# Patient Record
Sex: MALE
[De-identification: names, ages, dates, MRNs, and addresses within clinical notes are randomized per-mention and may not be internally consistent; named-entity substitution may affect disease eponyms.]

---

## 2022-02-23 ENCOUNTER — LABORATORY RESULT (OUTPATIENT)
Age: 57
End: 2022-02-23

## 2022-09-27 ENCOUNTER — LABORATORY RESULT (OUTPATIENT)
Age: 57
End: 2022-09-27

## 2022-09-27 ENCOUNTER — APPOINTMENT (OUTPATIENT)
Dept: PHYSICAL MEDICINE AND REHAB | Facility: CLINIC | Age: 57
End: 2022-09-27

## 2022-09-27 VITALS
WEIGHT: 236 LBS | BODY MASS INDEX: 33.79 KG/M2 | HEIGHT: 70 IN | RESPIRATION RATE: 16 BRPM | OXYGEN SATURATION: 99 % | HEART RATE: 72 BPM | TEMPERATURE: 97.8 F | DIASTOLIC BLOOD PRESSURE: 78 MMHG | SYSTOLIC BLOOD PRESSURE: 122 MMHG

## 2022-09-27 DIAGNOSIS — Z78.9 OTHER SPECIFIED HEALTH STATUS: ICD-10-CM

## 2022-09-27 PROCEDURE — 36410 VNPNXR 3YR/> PHY/QHP DX/THER: CPT

## 2022-09-27 PROCEDURE — 99396 PREV VISIT EST AGE 40-64: CPT | Mod: 25

## 2022-09-27 PROCEDURE — 93000 ELECTROCARDIOGRAM COMPLETE: CPT | Mod: 59

## 2022-09-27 PROCEDURE — G0444 DEPRESSION SCREEN ANNUAL: CPT | Mod: NC,59

## 2022-09-27 PROCEDURE — 81003 URINALYSIS AUTO W/O SCOPE: CPT | Mod: QW

## 2022-09-27 NOTE — HISTORY OF PRESENT ILLNESS
[FreeTextEntry1] : physical exam [de-identified] : Patient presents today for physical exam. His last PE was over a year ago and since that time he has not had any significant changes to his medical history. Denies any CP, SOB or diff breathing. No recent fever, chills, cough or cold type symptoms. Has no other complaints at this time.

## 2022-10-03 LAB
25(OH)D3 SERPL-MCNC: 36.3 NG/ML
ALBUMIN SERPL ELPH-MCNC: 4.7 G/DL
ALP BLD-CCNC: 61 U/L
ALT SERPL-CCNC: 26 U/L
ANION GAP SERPL CALC-SCNC: 11 MMOL/L
AST SERPL-CCNC: 25 U/L
BASOPHILS # BLD AUTO: 0.06 K/UL
BASOPHILS NFR BLD AUTO: 1.2 %
BILIRUB SERPL-MCNC: 0.5 MG/DL
BILIRUB UR QL STRIP: NEGATIVE
BUN SERPL-MCNC: 18 MG/DL
CALCIUM SERPL-MCNC: 9.7 MG/DL
CHLORIDE SERPL-SCNC: 104 MMOL/L
CHOLEST SERPL-MCNC: 176 MG/DL
CO2 SERPL-SCNC: 25 MMOL/L
CREAT SERPL-MCNC: 1.06 MG/DL
EGFR: 83 ML/MIN/1.73M2
EOSINOPHIL # BLD AUTO: 0.24 K/UL
EOSINOPHIL NFR BLD AUTO: 4.7 %
ESTIMATED AVERAGE GLUCOSE: 123 MG/DL
FERRITIN SERPL-MCNC: 277 NG/ML
FOLATE SERPL-MCNC: 17 NG/ML
GLUCOSE SERPL-MCNC: 100 MG/DL
GLUCOSE UR-MCNC: NEGATIVE
HBA1C MFR BLD HPLC: 5.9 %
HCG UR QL: 0.2 EU/DL
HCT VFR BLD CALC: 43.8 %
HDLC SERPL-MCNC: 43 MG/DL
HGB BLD-MCNC: 14.5 G/DL
HGB UR QL STRIP.AUTO: NORMAL
IMM GRANULOCYTES NFR BLD AUTO: 0.2 %
IRON SATN MFR SERPL: 38 %
IRON SERPL-MCNC: 106 UG/DL
KETONES UR-MCNC: NEGATIVE
LDLC SERPL CALC-MCNC: 115 MG/DL
LEUKOCYTE ESTERASE UR QL STRIP: NEGATIVE
LYMPHOCYTES # BLD AUTO: 2.12 K/UL
LYMPHOCYTES NFR BLD AUTO: 41.4 %
MAGNESIUM SERPL-MCNC: 2.4 MG/DL
MAN DIFF?: NORMAL
MCHC RBC-ENTMCNC: 32.1 PG
MCHC RBC-ENTMCNC: 33.1 GM/DL
MCV RBC AUTO: 96.9 FL
MONOCYTES # BLD AUTO: 0.45 K/UL
MONOCYTES NFR BLD AUTO: 8.8 %
NEUTROPHILS # BLD AUTO: 2.24 K/UL
NEUTROPHILS NFR BLD AUTO: 43.7 %
NITRITE UR QL STRIP: NEGATIVE
NONHDLC SERPL-MCNC: 133 MG/DL
PH UR STRIP: 5.5
PLATELET # BLD AUTO: 247 K/UL
POTASSIUM SERPL-SCNC: 4.4 MMOL/L
PROT SERPL-MCNC: 7.3 G/DL
PROT UR STRIP-MCNC: NEGATIVE
PSA SERPL-MCNC: 0.49 NG/ML
RBC # BLD: 4.52 M/UL
RBC # FLD: 12.8 %
SODIUM SERPL-SCNC: 140 MMOL/L
SP GR UR STRIP: 1.01
T3 SERPL-MCNC: 102 NG/DL
T3RU NFR SERPL: 1.1 TBI
T4 FREE SERPL-MCNC: 1.1 NG/DL
TESTOST SERPL-MCNC: 400 NG/DL
TIBC SERPL-MCNC: 280 UG/DL
TRIGL SERPL-MCNC: 90 MG/DL
TSH SERPL-ACNC: 2 UIU/ML
UIBC SERPL-MCNC: 173 UG/DL
VIT B12 SERPL-MCNC: 742 PG/ML
WBC # FLD AUTO: 5.12 K/UL

## 2022-10-11 ENCOUNTER — APPOINTMENT (OUTPATIENT)
Dept: PHYSICAL MEDICINE AND REHAB | Facility: CLINIC | Age: 57
End: 2022-10-11

## 2022-10-11 VITALS — BODY MASS INDEX: 33.79 KG/M2 | HEIGHT: 70 IN | WEIGHT: 236 LBS

## 2022-10-11 PROCEDURE — 99213 OFFICE O/P EST LOW 20 MIN: CPT | Mod: 95

## 2022-10-11 NOTE — PLAN
[FreeTextEntry1] : Labs reviewed with patient during this encounter.\par \par A1C = 5.9\par \par Patient to continue with present medications. \par Patient to start Vitamin therapy as discussed during visit - See medications below. \par Increase fluid intake.. \par RTO for repeat labs in 6 months. \par RTO in 6 months for re-evaluation. \par Diet teaching performed in depth during this visit related to cholesterol. \par At least 25 minutes was spent with patient via video conference reviewing findings/results during this visit. Ample time was provided to answer any questions or address concerns to the patients satisfaction..\par \par Patient was advised that this audiovisual encounter constitutes a billable visit, and that the visit will be billed on the same terms and conditions as an in-office, face-to-face visit. Patient was further advised they may be responsible for any co-payment, co-insurance, or other self-payment they would normally pay for an office visit, unless such self-payment was waived by their insurance carrier.\par 		\par At this time the patient is not willing to begin medication therapy for Cholesterol and wishes to modify diet in attempt to correct levels. The risk of elevated lipids were discussed at length during this visit and patient states understanding..\par \par

## 2022-10-11 NOTE — HISTORY OF PRESENT ILLNESS
[FreeTextEntry1] : Cholesterol [de-identified] : Patient encounter today for follow up on cholesterol.  States he has been doing well.  Denies any CP, SOB or diff breathing.  No recent fever, chills, cough or cold type symptoms.  Has no other complaints at this time.\par \par

## 2023-04-11 ENCOUNTER — APPOINTMENT (OUTPATIENT)
Dept: PHYSICAL MEDICINE AND REHAB | Facility: CLINIC | Age: 58
End: 2023-04-11
Payer: COMMERCIAL

## 2023-04-11 VITALS
DIASTOLIC BLOOD PRESSURE: 84 MMHG | SYSTOLIC BLOOD PRESSURE: 126 MMHG | OXYGEN SATURATION: 99 % | RESPIRATION RATE: 16 BRPM | TEMPERATURE: 98.1 F | HEART RATE: 64 BPM | BODY MASS INDEX: 34.5 KG/M2 | HEIGHT: 70 IN | WEIGHT: 241 LBS

## 2023-04-11 PROCEDURE — 36410 VNPNXR 3YR/> PHY/QHP DX/THER: CPT

## 2023-04-11 PROCEDURE — 99213 OFFICE O/P EST LOW 20 MIN: CPT | Mod: 25

## 2023-04-11 NOTE — PHYSICAL EXAM
[No Acute Distress] : no acute distress [Well Nourished] : well nourished [Well Developed] : well developed [Well-Appearing] : well-appearing [Normal Sclera/Conjunctiva] : normal sclera/conjunctiva [PERRL] : pupils equal round and reactive to light [EOMI] : extraocular movements intact [Normal Outer Ear/Nose] : the outer ears and nose were normal in appearance [Normal Oropharynx] : the oropharynx was normal [No JVD] : no jugular venous distention [No Lymphadenopathy] : no lymphadenopathy [Supple] : supple [Thyroid Normal, No Nodules] : the thyroid was normal and there were no nodules present [No Respiratory Distress] : no respiratory distress  [No Accessory Muscle Use] : no accessory muscle use [Clear to Auscultation] : lungs were clear to auscultation bilaterally [Normal Rate] : normal rate  [Regular Rhythm] : with a regular rhythm [Normal S1, S2] : normal S1 and S2 [No Carotid Bruits] : no carotid bruits [No Abdominal Bruit] : a ~M bruit was not heard ~T in the abdomen [No Varicosities] : no varicosities [Pedal Pulses Present] : the pedal pulses are present [No Edema] : there was no peripheral edema [No Palpable Aorta] : no palpable aorta [No Extremity Clubbing/Cyanosis] : no extremity clubbing/cyanosis [Soft] : abdomen soft [Non Tender] : non-tender [Non-distended] : non-distended [No Masses] : no abdominal mass palpated [No HSM] : no HSM [Normal Bowel Sounds] : normal bowel sounds [Normal Posterior Cervical Nodes] : no posterior cervical lymphadenopathy [Normal Anterior Cervical Nodes] : no anterior cervical lymphadenopathy [No CVA Tenderness] : no CVA  tenderness [No Joint Swelling] : no joint swelling [No Spinal Tenderness] : no spinal tenderness [Grossly Normal Strength/Tone] : grossly normal strength/tone [No Rash] : no rash [Coordination Grossly Intact] : coordination grossly intact [No Focal Deficits] : no focal deficits [Normal Gait] : normal gait [Deep Tendon Reflexes (DTR)] : deep tendon reflexes were 2+ and symmetric [Normal Affect] : the affect was normal [Normal Insight/Judgement] : insight and judgment were intact [de-identified] : systolic murmur

## 2023-04-11 NOTE — REVIEW OF SYSTEMS
[Negative] : Heme/Lymph [Nausea] : nausea [Abdominal Pain] : no abdominal pain [Constipation] : no constipation [Diarrhea] : no diarrhea [Vomiting] : no vomiting

## 2023-04-11 NOTE — HISTORY OF PRESENT ILLNESS
[FreeTextEntry1] : Cholesterol and pre-diabetes  [de-identified] : Patient encounter today for re-evaluation of cholesterol.  States he has been doing well.  Patient reports sinus infection in January and March, though states his sx have resolved. Patient reports straining both of his calves after running. He reports the pain has resolved last week and he plans to start walking/running again. Patient reports mild nausea after eating since last week that has been improving. He reports eating protein bar for breakfast, salad for lunch, and protein for dinner most days. He denies abdominal pain , vomiting, and diarrhea. Denies any CP, SOB or diff breathing.  No recent fever, chills, cough or cold type symptoms.  Has no other complaints at this time.\par \par

## 2023-04-11 NOTE — PLAN
[FreeTextEntry1] : \par Labs Drawn by Dr. Pino Caban due to poor venous access.  Patient required lab testing due to conditions in their past medical history requiring periodic monitoring.  Labs were sent to [facility].\par \par \par Discussed and reviewed current medications as follows; \par Rosuvastatin for HLD\par Patient to continue with present medications - all medications reconciled/reviewed during this visit and listed above.  \par Increase fluid intake.\par RTO in 7-10 days for re-evaluation.\par At least 20 minutes was spent with patient face to face examining and reviewing findings/results during this visit. Ample time was provided to answer any questions or address concerns to the patients satisfaction.\par \par I, Tootie Jay, attest that this documentation has been prepared under the direction and in the presence of Provider Pino Caban DNP\par \par The documentation recorded by the scribe, in my presence, accurately reflects the service I personally performed, and the decisions made by me with my edits as appropriate.\par Pino Caban DNP\par

## 2023-04-12 LAB
25(OH)D3 SERPL-MCNC: 23.7 NG/ML
ALBUMIN SERPL ELPH-MCNC: 4.6 G/DL
ALP BLD-CCNC: 64 U/L
ALT SERPL-CCNC: 27 U/L
ANION GAP SERPL CALC-SCNC: 11 MMOL/L
AST SERPL-CCNC: 21 U/L
BASOPHILS # BLD AUTO: 0.03 K/UL
BASOPHILS NFR BLD AUTO: 0.6 %
BILIRUB SERPL-MCNC: 0.4 MG/DL
BUN SERPL-MCNC: 19 MG/DL
CALCIUM SERPL-MCNC: 9.5 MG/DL
CHLORIDE SERPL-SCNC: 101 MMOL/L
CHOLEST SERPL-MCNC: 160 MG/DL
CO2 SERPL-SCNC: 26 MMOL/L
CREAT SERPL-MCNC: 1 MG/DL
EGFR: 88 ML/MIN/1.73M2
EOSINOPHIL # BLD AUTO: 0.29 K/UL
EOSINOPHIL NFR BLD AUTO: 5.3 %
ESTIMATED AVERAGE GLUCOSE: 114 MG/DL
GLUCOSE SERPL-MCNC: 99 MG/DL
HBA1C MFR BLD HPLC: 5.6 %
HCT VFR BLD CALC: 45 %
HDLC SERPL-MCNC: 42 MG/DL
HGB BLD-MCNC: 14.2 G/DL
IMM GRANULOCYTES NFR BLD AUTO: 0.4 %
LDLC SERPL CALC-MCNC: 93 MG/DL
LYMPHOCYTES # BLD AUTO: 1.9 K/UL
LYMPHOCYTES NFR BLD AUTO: 34.9 %
MAN DIFF?: NORMAL
MCHC RBC-ENTMCNC: 31.4 PG
MCHC RBC-ENTMCNC: 31.6 GM/DL
MCV RBC AUTO: 99.6 FL
MONOCYTES # BLD AUTO: 0.45 K/UL
MONOCYTES NFR BLD AUTO: 8.3 %
NEUTROPHILS # BLD AUTO: 2.76 K/UL
NEUTROPHILS NFR BLD AUTO: 50.5 %
NONHDLC SERPL-MCNC: 118 MG/DL
PLATELET # BLD AUTO: 275 K/UL
POTASSIUM SERPL-SCNC: 4 MMOL/L
PROT SERPL-MCNC: 7.2 G/DL
RBC # BLD: 4.52 M/UL
RBC # FLD: 13.2 %
SODIUM SERPL-SCNC: 138 MMOL/L
TRIGL SERPL-MCNC: 125 MG/DL
WBC # FLD AUTO: 5.45 K/UL

## 2023-04-20 ENCOUNTER — APPOINTMENT (OUTPATIENT)
Dept: PHYSICAL MEDICINE AND REHAB | Facility: CLINIC | Age: 58
End: 2023-04-20

## 2023-04-26 ENCOUNTER — APPOINTMENT (OUTPATIENT)
Dept: PHYSICAL MEDICINE AND REHAB | Facility: CLINIC | Age: 58
End: 2023-04-26
Payer: COMMERCIAL

## 2023-04-26 VITALS — HEIGHT: 70 IN | BODY MASS INDEX: 34.5 KG/M2 | WEIGHT: 241 LBS

## 2023-04-26 PROCEDURE — 99213 OFFICE O/P EST LOW 20 MIN: CPT | Mod: 95

## 2023-04-26 NOTE — HISTORY OF PRESENT ILLNESS
[Home] : at home, [unfilled] , at the time of the visit. [Medical Office: ([facility])___] : at the medical office located in  [Verbal consent obtained from patient] : the patient, [unfilled] [FreeTextEntry1] : Cholesterol [de-identified] : Patient encounter today for re-evaluation of cholesterol.  States he has been doing well.  Denies any CP, SOB or diff breathing.  No recent fever, chills, cough or cold type symptoms.  Has no other complaints at this time.\par \par

## 2023-04-26 NOTE — PLAN
[FreeTextEntry1] : Labs reviewed with patient during this encounter.\par \par A1C = 5.6 from 5.9\par \par Patient to continue with present medications - all medications reconciled/reviewed during this visit and listed above\par Patient to start Vitamin therapy as discussed during visit\par Increase fluid intake.. \par RTO for repeat labs in 6 months. \par RTO in 6 months for re-evaluation. \par Diet teaching for at least 8 minutes.performed in depth during this visit related to cholesterol and cardiovascular risks.\par At least 25 minutes was spent with patient via video conference reviewing findings/results during this visit. Ample time was provided to answer any questions or address concerns to the patients satisfaction..\par \par Patient was advised that this audiovisual encounter constitutes a billable visit, and that the visit will be billed on the same terms and conditions as an in-office, face-to-face visit. Patient was further advised they may be responsible for any co-payment, co-insurance, or other self-payment they would normally pay for an office visit, unless such self-payment was waived by their insurance carrier.\par \par \par

## 2023-07-12 ENCOUNTER — APPOINTMENT (OUTPATIENT)
Dept: PHYSICAL MEDICINE AND REHAB | Facility: CLINIC | Age: 58
End: 2023-07-12
Payer: COMMERCIAL

## 2023-07-12 ENCOUNTER — RESULT REVIEW (OUTPATIENT)
Age: 58
End: 2023-07-12

## 2023-07-12 VITALS
DIASTOLIC BLOOD PRESSURE: 76 MMHG | HEIGHT: 70 IN | HEART RATE: 80 BPM | SYSTOLIC BLOOD PRESSURE: 120 MMHG | RESPIRATION RATE: 16 BRPM | WEIGHT: 241 LBS | TEMPERATURE: 97.8 F | BODY MASS INDEX: 34.5 KG/M2 | OXYGEN SATURATION: 97 %

## 2023-07-12 DIAGNOSIS — J18.9 PNEUMONIA, UNSPECIFIED ORGANISM: ICD-10-CM

## 2023-07-12 PROCEDURE — 99214 OFFICE O/P EST MOD 30 MIN: CPT

## 2023-07-12 PROCEDURE — 99213 OFFICE O/P EST LOW 20 MIN: CPT

## 2023-07-12 NOTE — PLAN
[FreeTextEntry1] : \par Labs Drawn by Dr. Pino Caban due to poor venous access.  Patient required lab testing due to conditions in their past medical history requiring periodic monitoring.  Labs were sent to [facility].\par \par Start Prednisone and Cefdinir. \par D/C Doxycyline.\par Continue inhaler BID. \par Advised patient to use Children's liquid Benadryl, Ella Anvik plus, and Dayquil.\par Referral given for CXR.\par Discussed and reviewed current medications as follows; \par Rosuvastatin for HLD\par Patient to continue with present medications - all medications reconciled/reviewed during this visit and listed above.  \par Increase fluid intake.\par RTO in 7-10 days for re-evaluation.\par At least 20 minutes was spent with patient face to face examining and reviewing findings/results during this visit. Ample time was provided to answer any questions or address concerns to the patients satisfaction.\par \par I, Tootie Jay, attest that this documentation has been prepared under the direction and in the presence of Provider Pino Caban DNP\par \par The documentation recorded by the scribe, in my presence, accurately reflects the service I personally performed, and the decisions made by me with my edits as appropriate.\par Pino Caban DNP\par

## 2023-07-12 NOTE — HISTORY OF PRESENT ILLNESS
[FreeTextEntry1] : URI [de-identified] : Patient presents today for further evaluation of cough and congestion for the past 4 days. Pt was prescribed Prednisone and Bromphed from urgent care 2 weeks ago. Pt was re-evaluated 10 days ago, prescribed doxycycline and albuterol inhaler after CXR, which he has been using daily.  States their symptoms have been getting worse despite the use of OTC meds. Reports productive cough and PND. denies earache. denies any CP, SOB or diff breathing.  No recorded fever or chills.  States symptoms worsen with exertion, no alleviating factors.  Has no other complaints at this time.\par

## 2023-07-12 NOTE — PHYSICAL EXAM
[No Acute Distress] : no acute distress [Well Nourished] : well nourished [Well Developed] : well developed [Well-Appearing] : well-appearing [Normal Sclera/Conjunctiva] : normal sclera/conjunctiva [PERRL] : pupils equal round and reactive to light [EOMI] : extraocular movements intact [Normal Outer Ear/Nose] : the outer ears and nose were normal in appearance [Normal Oropharynx] : the oropharynx was normal [No JVD] : no jugular venous distention [Supple] : supple [Thyroid Normal, No Nodules] : the thyroid was normal and there were no nodules present [No Respiratory Distress] : no respiratory distress  [No Accessory Muscle Use] : no accessory muscle use [Normal Rate] : normal rate  [Regular Rhythm] : with a regular rhythm [Normal S1, S2] : normal S1 and S2 [No Carotid Bruits] : no carotid bruits [No Abdominal Bruit] : a ~M bruit was not heard ~T in the abdomen [No Varicosities] : no varicosities [Pedal Pulses Present] : the pedal pulses are present [No Edema] : there was no peripheral edema [No Palpable Aorta] : no palpable aorta [No Extremity Clubbing/Cyanosis] : no extremity clubbing/cyanosis [Soft] : abdomen soft [Non Tender] : non-tender [Non-distended] : non-distended [No Masses] : no abdominal mass palpated [No HSM] : no HSM [Normal Bowel Sounds] : normal bowel sounds [Normal Posterior Cervical Nodes] : no posterior cervical lymphadenopathy [Normal Anterior Cervical Nodes] : no anterior cervical lymphadenopathy [No CVA Tenderness] : no CVA  tenderness [No Spinal Tenderness] : no spinal tenderness [No Joint Swelling] : no joint swelling [Grossly Normal Strength/Tone] : grossly normal strength/tone [No Rash] : no rash [Coordination Grossly Intact] : coordination grossly intact [No Focal Deficits] : no focal deficits [Normal Gait] : normal gait [Deep Tendon Reflexes (DTR)] : deep tendon reflexes were 2+ and symmetric [Normal Affect] : the affect was normal [Normal Insight/Judgement] : insight and judgment were intact [de-identified] : post nasal drip  [de-identified] : mild anterior cervical lymphadenopathy, no posterior or post auricular lymphadenopathy  [de-identified] : expiratory wheezes throughout, no rales or rhonchi

## 2023-08-09 ENCOUNTER — APPOINTMENT (OUTPATIENT)
Dept: PHYSICAL MEDICINE AND REHAB | Facility: CLINIC | Age: 58
End: 2023-08-09
Payer: COMMERCIAL

## 2023-08-09 ENCOUNTER — LABORATORY RESULT (OUTPATIENT)
Age: 58
End: 2023-08-09

## 2023-08-09 ENCOUNTER — NON-APPOINTMENT (OUTPATIENT)
Age: 58
End: 2023-08-09

## 2023-08-09 VITALS
HEART RATE: 88 BPM | DIASTOLIC BLOOD PRESSURE: 76 MMHG | BODY MASS INDEX: 34.5 KG/M2 | RESPIRATION RATE: 16 BRPM | OXYGEN SATURATION: 98 % | TEMPERATURE: 97.9 F | SYSTOLIC BLOOD PRESSURE: 120 MMHG | HEIGHT: 70 IN | WEIGHT: 241 LBS

## 2023-08-09 DIAGNOSIS — Z00.00 ENCOUNTER FOR GENERAL ADULT MEDICAL EXAMINATION W/OUT ABNORMAL FINDINGS: ICD-10-CM

## 2023-08-09 PROCEDURE — 93000 ELECTROCARDIOGRAM COMPLETE: CPT | Mod: 59

## 2023-08-09 PROCEDURE — 99396 PREV VISIT EST AGE 40-64: CPT | Mod: 25

## 2023-08-09 PROCEDURE — G0444 DEPRESSION SCREEN ANNUAL: CPT | Mod: 33,59

## 2023-08-09 PROCEDURE — 81003 URINALYSIS AUTO W/O SCOPE: CPT | Mod: QW

## 2023-08-09 PROCEDURE — 36410 VNPNXR 3YR/> PHY/QHP DX/THER: CPT

## 2023-08-09 RX ORDER — CHROMIUM 200 MCG
TABLET ORAL
Refills: 0 | Status: ACTIVE | COMMUNITY

## 2023-08-09 NOTE — PLAN
[FreeTextEntry1] : Labs Drawn by Dr. Pino Caban due to poor venous access.  Patient required lab testing due to conditions in their past medical history requiring periodic monitoring.  Labs were sent to [facility].  EKG - NSR - 67  , MARION - 0. 164, No acute T wave changes noted..  Discussed and reviewed medications with patient as follows; Rosuvastatin- HLD  Patient to continue with present medications - all medications reconciled/reviewed during this visit and listed above.  Increase fluid intake.  RTO in 7-10 days for re-evaluation. I, Tootie Jay, attest that this documentation has been prepared under the direction and in the presence of Provider Pino Caban DNP The documentation recorded by the scribe, in my presence, accurately reflects the service I personally performed, and the decisions made by me with my edits as appropriate. Pino Caban DNP

## 2023-08-09 NOTE — PHYSICAL EXAM
[No Acute Distress] : no acute distress [Well Nourished] : well nourished [Well Developed] : well developed [Well-Appearing] : well-appearing [Normal Sclera/Conjunctiva] : normal sclera/conjunctiva [PERRL] : pupils equal round and reactive to light [EOMI] : extraocular movements intact [Normal Outer Ear/Nose] : the outer ears and nose were normal in appearance [Normal Oropharynx] : the oropharynx was normal [No JVD] : no jugular venous distention [No Lymphadenopathy] : no lymphadenopathy [Supple] : supple [Thyroid Normal, No Nodules] : the thyroid was normal and there were no nodules present [No Respiratory Distress] : no respiratory distress  [No Accessory Muscle Use] : no accessory muscle use [Clear to Auscultation] : lungs were clear to auscultation bilaterally [Normal Rate] : normal rate  [Regular Rhythm] : with a regular rhythm [Normal S1, S2] : normal S1 and S2 [No Carotid Bruits] : no carotid bruits [No Abdominal Bruit] : a ~M bruit was not heard ~T in the abdomen [No Varicosities] : no varicosities [Pedal Pulses Present] : the pedal pulses are present [No Edema] : there was no peripheral edema [No Palpable Aorta] : no palpable aorta [No Extremity Clubbing/Cyanosis] : no extremity clubbing/cyanosis [Soft] : abdomen soft [Non Tender] : non-tender [Non-distended] : non-distended [No Masses] : no abdominal mass palpated [No HSM] : no HSM [Normal Bowel Sounds] : normal bowel sounds [Normal Posterior Cervical Nodes] : no posterior cervical lymphadenopathy [Normal Anterior Cervical Nodes] : no anterior cervical lymphadenopathy [No CVA Tenderness] : no CVA  tenderness [No Spinal Tenderness] : no spinal tenderness [No Joint Swelling] : no joint swelling [Grossly Normal Strength/Tone] : grossly normal strength/tone [No Rash] : no rash [Coordination Grossly Intact] : coordination grossly intact [No Focal Deficits] : no focal deficits [Normal Gait] : normal gait [Deep Tendon Reflexes (DTR)] : deep tendon reflexes were 2+ and symmetric [Normal Affect] : the affect was normal [Normal Insight/Judgement] : insight and judgment were intact [de-identified] : cardiac murmur

## 2023-08-09 NOTE — HEALTH RISK ASSESSMENT
[0] : 2) Feeling down, depressed, or hopeless: Not at all (0) [PHQ-9 Negative - No further assessment needed] : PHQ-9 Negative - No further assessment needed [Patient reported colonoscopy was normal] : Patient reported colonoscopy was normal [ColonoscopyDate] : 09/2022

## 2023-08-09 NOTE — HISTORY OF PRESENT ILLNESS
[FreeTextEntry1] : annual wellness visit  [de-identified] : Patient presents today for physical exam. His last PE was over a year ago and since that time he has not had any significant changes to his medical history. Pt states his cough and congestion has improved since last visit. Denies any CP, SOB or diff breathing. Denies abdominal pain, blood in stool, or changes in bowel habits. No recent fever or chills. Has no other complaints at this time.

## 2023-08-14 LAB
25(OH)D3 SERPL-MCNC: 37.2 NG/ML
ALBUMIN SERPL ELPH-MCNC: 4.4 G/DL
ALP BLD-CCNC: 59 U/L
ALT SERPL-CCNC: 23 U/L
ANION GAP SERPL CALC-SCNC: 13 MMOL/L
AST SERPL-CCNC: 24 U/L
B BURGDOR AB SER-IMP: NEGATIVE
B BURGDOR IGG+IGM SER QL: 0.14 INDEX
BILIRUB SERPL-MCNC: 0.4 MG/DL
BILIRUB UR QL STRIP: NEGATIVE
BUN SERPL-MCNC: 16 MG/DL
CALCIUM SERPL-MCNC: 9.2 MG/DL
CHLORIDE SERPL-SCNC: 103 MMOL/L
CHOLEST SERPL-MCNC: 205 MG/DL
CO2 SERPL-SCNC: 22 MMOL/L
COLLECTION METHOD: NORMAL
CREAT SERPL-MCNC: 0.97 MG/DL
EGFR: 92 ML/MIN/1.73M2
ESTIMATED AVERAGE GLUCOSE: 117 MG/DL
FERRITIN SERPL-MCNC: 250 NG/ML
FOLATE SERPL-MCNC: >20 NG/ML
GLUCOSE SERPL-MCNC: 103 MG/DL
GLUCOSE UR-MCNC: NEGATIVE
HBA1C MFR BLD HPLC: 5.7 %
HCG UR QL: 0.2 EU/DL
HDLC SERPL-MCNC: 52 MG/DL
HGB UR QL STRIP.AUTO: NORMAL
IRON SATN MFR SERPL: 33 %
IRON SERPL-MCNC: 108 UG/DL
KETONES UR-MCNC: NEGATIVE
LDLC SERPL CALC-MCNC: 136 MG/DL
LEUKOCYTE ESTERASE UR QL STRIP: NEGATIVE
MAGNESIUM SERPL-MCNC: 2.3 MG/DL
NITRITE UR QL STRIP: NEGATIVE
NONHDLC SERPL-MCNC: 153 MG/DL
PH UR STRIP: 6
POTASSIUM SERPL-SCNC: 4.4 MMOL/L
PROT SERPL-MCNC: 6.9 G/DL
PROT UR STRIP-MCNC: NEGATIVE
PSA SERPL-MCNC: 0.46 NG/ML
SODIUM SERPL-SCNC: 138 MMOL/L
SP GR UR STRIP: 1.02
T3 SERPL-MCNC: 104 NG/DL
T3RU NFR SERPL: 1 TBI
T4 FREE SERPL-MCNC: 1.1 NG/DL
TESTOST SERPL-MCNC: 423 NG/DL
TIBC SERPL-MCNC: 325 UG/DL
TRIGL SERPL-MCNC: 97 MG/DL
TSH SERPL-ACNC: 2.65 UIU/ML
UIBC SERPL-MCNC: 217 UG/DL
VIT B12 SERPL-MCNC: 723 PG/ML

## 2023-08-24 ENCOUNTER — APPOINTMENT (OUTPATIENT)
Dept: PHYSICAL MEDICINE AND REHAB | Facility: CLINIC | Age: 58
End: 2023-08-24
Payer: COMMERCIAL

## 2023-08-24 VITALS — WEIGHT: 241 LBS | HEIGHT: 70 IN | BODY MASS INDEX: 34.5 KG/M2

## 2023-08-24 PROCEDURE — 99213 OFFICE O/P EST LOW 20 MIN: CPT | Mod: 95

## 2023-08-24 NOTE — PLAN
[FreeTextEntry1] : Labs reviewed with patient during this encounter.  Patient to continue with present medications - all medications reconciled/reviewed during this visit and listed above Patient to start Vitamin therapy as discussed during visit Increase fluid intake..  RTO for repeat labs in 6 months.  RTO in 6 months for re-evaluation.  Diet teaching for at least 8 minutes.performed in depth during this visit related to cholesterol and cardiovascular risks. At least 25 minutes was spent with patient via video conference reviewing findings/results during this visit. Ample time was provided to answer any questions or address concerns to the patients satisfaction..  Patient was advised that this audiovisual encounter constitutes a billable visit, and that the visit will be billed on the same terms and conditions as an in-office, face-to-face visit. Patient was further advised they may be responsible for any co-payment, co-insurance, or other self-payment they would normally pay for an office visit, unless such self-payment was waived by their insurance carrier. 		 At this time the patient is not willing to begin medication therapy for Cholesterol and wishes to modify diet in attempt to correct levels. The risk of elevated lipids were discussed at length during this visit and patient states understanding..

## 2023-08-24 NOTE — HISTORY OF PRESENT ILLNESS
[Home] : at home, [unfilled] , at the time of the visit. [Medical Office: ([facility])___] : at the medical office located in  [Verbal consent obtained from patient] : the patient, [unfilled] [FreeTextEntry1] : Cholesterol [de-identified] : Patient encounter today for re-evaluation of cholesterol.  States he has been doing well.  Denies any CP, SOB or diff breathing.  No recent fever, chills, cough or cold type symptoms.  Has no other complaints at this time.

## 2023-12-28 ENCOUNTER — APPOINTMENT (OUTPATIENT)
Dept: ORTHOPEDIC SURGERY | Facility: CLINIC | Age: 58
End: 2023-12-28
Payer: OTHER MISCELLANEOUS

## 2023-12-28 VITALS — WEIGHT: 240 LBS | BODY MASS INDEX: 34.36 KG/M2 | HEIGHT: 70 IN

## 2023-12-28 DIAGNOSIS — M76.32 ILIOTIBIAL BAND SYNDROME, LEFT LEG: ICD-10-CM

## 2023-12-28 DIAGNOSIS — M25.562 PAIN IN LEFT KNEE: ICD-10-CM

## 2023-12-28 DIAGNOSIS — Z78.9 OTHER SPECIFIED HEALTH STATUS: ICD-10-CM

## 2023-12-28 DIAGNOSIS — M17.12 UNILATERAL PRIMARY OSTEOARTHRITIS, LEFT KNEE: ICD-10-CM

## 2023-12-28 PROCEDURE — 73564 X-RAY EXAM KNEE 4 OR MORE: CPT | Mod: LT

## 2023-12-28 PROCEDURE — 99214 OFFICE O/P EST MOD 30 MIN: CPT

## 2023-12-28 NOTE — IMAGING
[Left] : left knee [All Views] : anteroposterior, lateral, skyline, and anteroposterior standing [de-identified] : The patient is a well appearing 58 year  old male of their stated age.  Patient ambulates with a normal gait.  Negative straight leg raise bilateral   Effected Knee: LEFT  ROM:  0-145 degrees  Lachman: Negative  Pivot Shift: Negative  Anterior Drawer: Negative  Posterior Drawer / Sag:Negative  Varus Stress 0 degrees: Stable  Varus Stress 30 degrees: Stable  Valgus Stress 0 degrees: Stable  Valgus Stress 30 degrees: Stable  Medial Kaitlynn: Negative  Lateral Kaitlynn: Negative  Patella Glide: 2+  Patella Apprehension: Negative  Patella Grind: Negative  Pes [place] Valgus: Negative  Pes Cavus: Negative   Palpation:  Medial Joint Line: Nontender  Lateral Joint Line: Nontender  Medial Collateral Ligament: Nontender  Lateral Collateral Ligament/PLC: Nontender  Distal Femur: Nontender  Proximal Tibia: Nontender  Tibial Tubercle: Nontender  Gerdy's Tubercle: Nontender  Distal Pole Patella: Nontender  Quadriceps Tendon: Nontender &  Intact  Patella Tendon: Nontender &  Intact  Medial Femoral Condyle: Nontender  Medial Distal Hamstring/PES: Nontender  Lateral Distal Hamstring: Nontender & Stable  Iliotibial Band: Nontender & TIGHT Medial Patellofemoral Ligament: Nontender  Adductor: Nontender  Proximal GSC-Plantaris: Nontender  Calf: Supple & Nontender   Inspection:  Deformity: No  Erythema: No  Ecchymosis: No  Abrasions: No  Effusion: No  Prepatella Bursitis: No  Neurologic Exam:  Sensation L4-S1: Grossly Intact  Motor Exam:  Quadriceps: 5 out of 5  Hamstrings: 5 out of 5  EHL: 5 out of 5  FHL: 5 out of 5  TA: 5 out of 5  GS: 5 out of 5  Circulatory/Pulses:  Dorsalis Pedis: 2+  Posterior Tibialis: 2+  Additional Pertinent Findings: None   Contralateral Knee:                           	  ROM: 0-145 degrees  Other Pertinent Findings: None   Assessment: The patient is a 58 year old male with left knee pain and radiographic and physical exam findings consistent with early arthritis and ITB syndrome.    The patients condition is acute  Documents/Results Reviewed Today: X-Ray left knee  Tests/Studies Independently Interpreted Today: X-Ray left knee reveals evidence of mild medial joint compartment narrowing, mild patellofemoral OA.  Pertinent findings include: 0-145, 5/5 quadriceps strength, no tenderness at this time, tight ITB. Confounding medical conditions/concerns: Hx of left knee medial meniscectomy (6/18/19) with Dr. Ballesteros  Plan: Advised the patient that his recent onset of knee pain is consistent with an aggravation of early arthritic change. Patient will start physical therapy, HEP, and stretching. Advised patient to obtain Reparel sleeve. Recommended rolling ITB. Discussed taking OTC anti-inflammatories as needed - use as directed. Modify activity as discussed.  Tests Ordered: None  Prescription Medications Ordered: Discussed appropriate use of OTC anti-inflammatories and analgesics (including but not limited to Aleve, Advil, Tylenol, Motrin, Ibuprofen, Voltaren gel, etc.)  Braces/DME Ordered: None  Activity/Work/Sports Status: None  Additional Instructions: None Follow-Up: PRN  The patient's current medication management of their orthopedic diagnosis was reviewed today: (1) We discussed a comprehensive treatment plan that included possible pharmaceutical management involving the use of prescription strength medications including but not limited to options such as oral Naprosyn 500mg BID, once daily Meloxicam 15 mg, or 500-650 mg Tylenol versus over the counter oral medications and topical prescription NSAID Pennsaid vs over the counter Voltaren gel.  Based on our extensive discussion, the patient declined prescription medication and will use over the counter Advil, Alleve, Voltaren Gel or Tylenol as directed. (2) There is a moderate risk of morbidity with further treatment, especially from use of prescription strength medications and possible side effects of these medications which include upset stomach with oral medications, skin reactions to topical medications and cardiac/renal issues with long term use. (3) I recommended that the patient follow-up with their medical physician to discuss any significant specific potential issues with long term medication use such as interactions with current medications or with exacerbation of underlying medical comorbidities. (4) The benefits and risks associated with use of injectable, oral or topical, prescription and over the counter anti-inflammatory medications were discussed with the patient. The patient voiced understanding of the risks including but not limited to bleeding, stroke, kidney dysfunction, heart disease, and were referred to the black box warning label for further information.   Daina SPENCER attest that this documentation has been prepared under the direction and in the presence of Provider Dr. Chester Ballesteros.   The documentation recorded by the scribe accurately reflects the services I, Dr. Chester Ballesteros, personally performed and the decisions made by me. [FreeTextEntry9] : X-Ray left knee reveals evidence of mild medial joint compartment narrowing, mild patellofemoral OA.

## 2023-12-28 NOTE — HISTORY OF PRESENT ILLNESS
[de-identified] : The patient is a 58 year  old right hand dominant male who presents today complaining of left knee pain.   Date of Injury/Onset: 2/25/19; Reinjury 10/25/2023 Pain:    At Rest: 8/10  With Activity:  8/10  Mechanism of injury: Pt injured his knee at work in 2019 and had surgery with Dr. Johnston. 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 This is not an athletic injury occurring associated with an interscholastic or organized sports team. Quality of symptoms: dull aching pain at rest, sharp pain with activity, limited ROM, stiffness Improves with: ice, NSAIDs Worse with: bending, kneeling, prolonged sitting Prior treatment: none Prior Imaging: none Out of work/sport: currently working School/Sport/Position/Occupation: Phys  at [employer] Additional Information: Has seen Dr. johnston in the past for this knee

## 2024-02-05 ENCOUNTER — APPOINTMENT (OUTPATIENT)
Dept: PHYSICAL MEDICINE AND REHAB | Facility: CLINIC | Age: 59
End: 2024-02-05
Payer: COMMERCIAL

## 2024-02-05 VITALS — HEART RATE: 81 BPM | TEMPERATURE: 97.8 F | RESPIRATION RATE: 16 BRPM | OXYGEN SATURATION: 98 %

## 2024-02-05 PROCEDURE — 99213 OFFICE O/P EST LOW 20 MIN: CPT

## 2024-02-05 RX ORDER — ROSUVASTATIN CALCIUM 20 MG/1
20 TABLET, FILM COATED ORAL
Qty: 90 | Refills: 1 | Status: ACTIVE | COMMUNITY

## 2024-02-05 RX ORDER — ROSUVASTATIN CALCIUM 10 MG/1
10 TABLET, FILM COATED ORAL DAILY
Qty: 90 | Refills: 1 | Status: DISCONTINUED | COMMUNITY
Start: 2022-09-27 | End: 2024-02-05

## 2024-02-05 NOTE — PLAN
[FreeTextEntry1] : Discussed risks associated with weight loss injectables.    Discussed and reviewed current medications as follows;  Crestor-HLD Patient to continue with present medications - all medications reconciled/reviewed during this visit and listed above.   Increase fluid intake. RTO in 10 weeks for repeat fasting labs prior to cardiology follow up. At least 20 minutes was spent with patient face to face examining and reviewing findings/results during this visit. Ample time was provided to answer any questions or address concerns to the patients satisfaction.  I, Tootie Jay, attest that this documentation has been prepared under the direction and in the presence of Provider Pino Caban DNP The documentation recorded by the scribe, in my presence, accurately reflects the service I personally performed, and the decisions made by me with my edits as appropriate. Pino Caban DNP

## 2024-02-05 NOTE — PHYSICAL EXAM
[No Acute Distress] : no acute distress [Well Nourished] : well nourished [Well Developed] : well developed [Well-Appearing] : well-appearing [Normal Sclera/Conjunctiva] : normal sclera/conjunctiva [PERRL] : pupils equal round and reactive to light [EOMI] : extraocular movements intact [Normal Outer Ear/Nose] : the outer ears and nose were normal in appearance [Normal Oropharynx] : the oropharynx was normal [No JVD] : no jugular venous distention [No Lymphadenopathy] : no lymphadenopathy [Supple] : supple [Thyroid Normal, No Nodules] : the thyroid was normal and there were no nodules present [No Respiratory Distress] : no respiratory distress  [No Accessory Muscle Use] : no accessory muscle use [Clear to Auscultation] : lungs were clear to auscultation bilaterally [Normal Rate] : normal rate  [Regular Rhythm] : with a regular rhythm [Normal S1, S2] : normal S1 and S2 [No Carotid Bruits] : no carotid bruits [No Abdominal Bruit] : a ~M bruit was not heard ~T in the abdomen [No Varicosities] : no varicosities [Pedal Pulses Present] : the pedal pulses are present [No Edema] : there was no peripheral edema [No Palpable Aorta] : no palpable aorta [No Extremity Clubbing/Cyanosis] : no extremity clubbing/cyanosis [Soft] : abdomen soft [Non Tender] : non-tender [Non-distended] : non-distended [No Masses] : no abdominal mass palpated [No HSM] : no HSM [Normal Bowel Sounds] : normal bowel sounds [Normal Posterior Cervical Nodes] : no posterior cervical lymphadenopathy [Normal Anterior Cervical Nodes] : no anterior cervical lymphadenopathy [No CVA Tenderness] : no CVA  tenderness [No Spinal Tenderness] : no spinal tenderness [No Joint Swelling] : no joint swelling [Grossly Normal Strength/Tone] : grossly normal strength/tone [No Rash] : no rash [Coordination Grossly Intact] : coordination grossly intact [No Focal Deficits] : no focal deficits [Normal Gait] : normal gait [Deep Tendon Reflexes (DTR)] : deep tendon reflexes were 2+ and symmetric [Normal Affect] : the affect was normal [Normal Insight/Judgement] : insight and judgment were intact [de-identified] : systolic murmur

## 2024-02-05 NOTE — HISTORY OF PRESENT ILLNESS
[FreeTextEntry1] : Cholesterol  [de-identified] : Patient presents today for evaluation of obesity and cardiology follow up. Pt followed with cardiology including calcium score of 800. Pt's Crestor was increased to 20mg QD. Pt states he is interested in starting a weight loss injectables. States he has been doing well.  Reports starting CPAP use for BIJU. Denies any CP, SOB or diff breathing. Reports mild sinus congestion today, though states sinus drainage has improved. No recent fever, chills, cough or cold type symptoms.  Has no other complaints at this time.

## 2024-02-20 ENCOUNTER — APPOINTMENT (OUTPATIENT)
Dept: PHYSICAL MEDICINE AND REHAB | Facility: CLINIC | Age: 59
End: 2024-02-20

## 2024-04-01 ENCOUNTER — APPOINTMENT (OUTPATIENT)
Dept: PHYSICAL MEDICINE AND REHAB | Facility: CLINIC | Age: 59
End: 2024-04-01
Payer: COMMERCIAL

## 2024-04-01 VITALS
OXYGEN SATURATION: 99 % | WEIGHT: 240 LBS | HEART RATE: 65 BPM | RESPIRATION RATE: 16 BRPM | SYSTOLIC BLOOD PRESSURE: 120 MMHG | HEIGHT: 70 IN | DIASTOLIC BLOOD PRESSURE: 78 MMHG | TEMPERATURE: 98.5 F | BODY MASS INDEX: 34.36 KG/M2

## 2024-04-01 DIAGNOSIS — E66.9 OBESITY, UNSPECIFIED: ICD-10-CM

## 2024-04-01 DIAGNOSIS — R01.1 CARDIAC MURMUR, UNSPECIFIED: ICD-10-CM

## 2024-04-01 PROCEDURE — 99213 OFFICE O/P EST LOW 20 MIN: CPT

## 2024-04-01 PROCEDURE — 36410 VNPNXR 3YR/> PHY/QHP DX/THER: CPT

## 2024-04-01 RX ORDER — PREDNISONE 20 MG/1
20 TABLET ORAL
Qty: 6 | Refills: 0 | Status: DISCONTINUED | COMMUNITY
Start: 2023-07-12 | End: 2024-04-01

## 2024-04-01 RX ORDER — CEFDINIR 300 MG/1
300 CAPSULE ORAL TWICE DAILY
Qty: 14 | Refills: 0 | Status: DISCONTINUED | COMMUNITY
Start: 2023-07-12 | End: 2024-04-01

## 2024-04-01 NOTE — PHYSICAL EXAM
[No Acute Distress] : no acute distress [Well Nourished] : well nourished [Well Developed] : well developed [Well-Appearing] : well-appearing [Normal Sclera/Conjunctiva] : normal sclera/conjunctiva [EOMI] : extraocular movements intact [PERRL] : pupils equal round and reactive to light [Normal Outer Ear/Nose] : the outer ears and nose were normal in appearance [Normal Oropharynx] : the oropharynx was normal [No JVD] : no jugular venous distention [No Lymphadenopathy] : no lymphadenopathy [Supple] : supple [Thyroid Normal, No Nodules] : the thyroid was normal and there were no nodules present [No Accessory Muscle Use] : no accessory muscle use [No Respiratory Distress] : no respiratory distress  [Clear to Auscultation] : lungs were clear to auscultation bilaterally [Normal Rate] : normal rate  [Regular Rhythm] : with a regular rhythm [Normal S1, S2] : normal S1 and S2 [No Carotid Bruits] : no carotid bruits [No Abdominal Bruit] : a ~M bruit was not heard ~T in the abdomen [No Varicosities] : no varicosities [Pedal Pulses Present] : the pedal pulses are present [No Edema] : there was no peripheral edema [No Palpable Aorta] : no palpable aorta [No Extremity Clubbing/Cyanosis] : no extremity clubbing/cyanosis [Non Tender] : non-tender [Soft] : abdomen soft [No Masses] : no abdominal mass palpated [Non-distended] : non-distended [No HSM] : no HSM [Normal Bowel Sounds] : normal bowel sounds [Normal Anterior Cervical Nodes] : no anterior cervical lymphadenopathy [Normal Posterior Cervical Nodes] : no posterior cervical lymphadenopathy [No CVA Tenderness] : no CVA  tenderness [No Spinal Tenderness] : no spinal tenderness [Grossly Normal Strength/Tone] : grossly normal strength/tone [No Joint Swelling] : no joint swelling [Coordination Grossly Intact] : coordination grossly intact [No Rash] : no rash [No Focal Deficits] : no focal deficits [Normal Gait] : normal gait [Deep Tendon Reflexes (DTR)] : deep tendon reflexes were 2+ and symmetric [Normal Affect] : the affect was normal [Normal Insight/Judgement] : insight and judgment were intact [de-identified] : systolic murmur

## 2024-04-01 NOTE — HISTORY OF PRESENT ILLNESS
[FreeTextEntry1] : Cholesterol and pre-diabetes [de-identified] : Patient encounter today for re-evaluation of cholesterol and diabetes.  States he has been doing well.  Denies any CP, SOB or diff breathing.  No recent fever, chills, cough or cold type symptoms.  Has no other complaints at this time.

## 2024-04-01 NOTE — PLAN
[FreeTextEntry1] :  Labs Drawn by Dr. Pino Caban due to poor venous access.  Patient required lab testing due to conditions in their past medical history requiring periodic monitoring.  Labs were sent to [facility].   Discussed and reviewed current medications as follows;  Rosuvastatin-HLD Patient to continue with present medications - all medications reconciled/reviewed during this visit and listed above.   Increase fluid intake. RTO in 7-10 days for re-evaluation. At least 20 minutes was spent with patient face to face examining and reviewing findings/results during this visit. Ample time was provided to answer any questions or address concerns to the patients satisfaction.  I, Tootie Jay, attest that this documentation has been prepared under the direction and in the presence of Provider Pino Caban DNP The documentation recorded by the scribe, in my presence, accurately reflects the service I personally performed, and the decisions made by me with my edits as appropriate. Pino Caban DNP

## 2024-04-02 LAB
25(OH)D3 SERPL-MCNC: 27.3 NG/ML
ALBUMIN SERPL ELPH-MCNC: 4.6 G/DL
ALP BLD-CCNC: 73 U/L
ALT SERPL-CCNC: 23 U/L
ANION GAP SERPL CALC-SCNC: 10 MMOL/L
AST SERPL-CCNC: 21 U/L
BASOPHILS # BLD AUTO: 0.06 K/UL
BASOPHILS NFR BLD AUTO: 1.1 %
BILIRUB SERPL-MCNC: 0.3 MG/DL
BUN SERPL-MCNC: 17 MG/DL
CALCIUM SERPL-MCNC: 9.1 MG/DL
CHLORIDE SERPL-SCNC: 99 MMOL/L
CHOLEST SERPL-MCNC: 134 MG/DL
CO2 SERPL-SCNC: 26 MMOL/L
CREAT SERPL-MCNC: 0.95 MG/DL
EGFR: 93 ML/MIN/1.73M2
EOSINOPHIL # BLD AUTO: 0.29 K/UL
EOSINOPHIL NFR BLD AUTO: 5.2 %
ESTIMATED AVERAGE GLUCOSE: 117 MG/DL
GLUCOSE SERPL-MCNC: 98 MG/DL
HBA1C MFR BLD HPLC: 5.7 %
HCT VFR BLD CALC: 40.8 %
HDLC SERPL-MCNC: 46 MG/DL
HGB BLD-MCNC: 13.7 G/DL
IMM GRANULOCYTES NFR BLD AUTO: 0 %
LDLC SERPL CALC-MCNC: 67 MG/DL
LYMPHOCYTES # BLD AUTO: 1.99 K/UL
LYMPHOCYTES NFR BLD AUTO: 35.5 %
MAN DIFF?: NORMAL
MCHC RBC-ENTMCNC: 31.1 PG
MCHC RBC-ENTMCNC: 33.6 GM/DL
MCV RBC AUTO: 92.7 FL
MONOCYTES # BLD AUTO: 0.53 K/UL
MONOCYTES NFR BLD AUTO: 9.4 %
NEUTROPHILS # BLD AUTO: 2.74 K/UL
NEUTROPHILS NFR BLD AUTO: 48.8 %
NONHDLC SERPL-MCNC: 87 MG/DL
PLATELET # BLD AUTO: 216 K/UL
POTASSIUM SERPL-SCNC: 4.2 MMOL/L
PROT SERPL-MCNC: 7.4 G/DL
RBC # BLD: 4.4 M/UL
RBC # FLD: 12.9 %
SODIUM SERPL-SCNC: 135 MMOL/L
TRIGL SERPL-MCNC: 110 MG/DL
WBC # FLD AUTO: 5.61 K/UL

## 2024-04-11 ENCOUNTER — APPOINTMENT (OUTPATIENT)
Dept: PHYSICAL MEDICINE AND REHAB | Facility: CLINIC | Age: 59
End: 2024-04-11
Payer: COMMERCIAL

## 2024-04-11 VITALS — WEIGHT: 240 LBS | BODY MASS INDEX: 34.36 KG/M2 | HEIGHT: 70 IN

## 2024-04-11 DIAGNOSIS — R73.03 PREDIABETES.: ICD-10-CM

## 2024-04-11 DIAGNOSIS — E55.9 VITAMIN D DEFICIENCY, UNSPECIFIED: ICD-10-CM

## 2024-04-11 DIAGNOSIS — E78.00 PURE HYPERCHOLESTEROLEMIA, UNSPECIFIED: ICD-10-CM

## 2024-04-11 PROCEDURE — 99213 OFFICE O/P EST LOW 20 MIN: CPT

## 2024-04-11 NOTE — HISTORY OF PRESENT ILLNESS
[Home] : at home, [unfilled] , at the time of the visit. [Medical Office: ([facility])___] : at the medical office located in  [Verbal consent obtained from patient] : the patient, [unfilled] [FreeTextEntry1] : Cholesterol [de-identified] : Patient encounter today for re-evaluation of cholesterol.  States he has been doing well.  Denies any CP, SOB or diff breathing.  No recent fever, chills, cough or cold type symptoms.  Has no other complaints at this time.

## 2024-11-12 ENCOUNTER — APPOINTMENT (OUTPATIENT)
Dept: PHYSICAL MEDICINE AND REHAB | Facility: CLINIC | Age: 59
End: 2024-11-12
Payer: COMMERCIAL

## 2024-11-12 VITALS
RESPIRATION RATE: 16 BRPM | OXYGEN SATURATION: 99 % | DIASTOLIC BLOOD PRESSURE: 68 MMHG | WEIGHT: 240 LBS | BODY MASS INDEX: 34.36 KG/M2 | HEIGHT: 70 IN | SYSTOLIC BLOOD PRESSURE: 118 MMHG | TEMPERATURE: 97.8 F | HEART RATE: 81 BPM

## 2024-11-12 DIAGNOSIS — E55.9 VITAMIN D DEFICIENCY, UNSPECIFIED: ICD-10-CM

## 2024-11-12 DIAGNOSIS — E78.00 PURE HYPERCHOLESTEROLEMIA, UNSPECIFIED: ICD-10-CM

## 2024-11-12 DIAGNOSIS — R73.09 OTHER ABNORMAL GLUCOSE: ICD-10-CM

## 2024-11-12 DIAGNOSIS — G47.30 SLEEP APNEA, UNSPECIFIED: ICD-10-CM

## 2024-11-12 DIAGNOSIS — R01.1 CARDIAC MURMUR, UNSPECIFIED: ICD-10-CM

## 2024-11-12 PROCEDURE — 99213 OFFICE O/P EST LOW 20 MIN: CPT

## 2024-11-12 PROCEDURE — 36410 VNPNXR 3YR/> PHY/QHP DX/THER: CPT

## 2024-11-13 LAB
25(OH)D3 SERPL-MCNC: 29.7 NG/ML
ALBUMIN SERPL ELPH-MCNC: 5 G/DL
ALP BLD-CCNC: 68 U/L
ALT SERPL-CCNC: 32 U/L
ANION GAP SERPL CALC-SCNC: 14 MMOL/L
AST SERPL-CCNC: 29 U/L
BASOPHILS # BLD AUTO: 0.05 K/UL
BASOPHILS NFR BLD AUTO: 0.8 %
BILIRUB SERPL-MCNC: 0.4 MG/DL
BUN SERPL-MCNC: 22 MG/DL
CALCIUM SERPL-MCNC: 9.9 MG/DL
CHLORIDE SERPL-SCNC: 102 MMOL/L
CHOLEST SERPL-MCNC: 141 MG/DL
CO2 SERPL-SCNC: 24 MMOL/L
CREAT SERPL-MCNC: 1.13 MG/DL
EGFR: 76 ML/MIN/1.73M2
EOSINOPHIL # BLD AUTO: 0.24 K/UL
EOSINOPHIL NFR BLD AUTO: 4 %
ESTIMATED AVERAGE GLUCOSE: 114 MG/DL
GLUCOSE SERPL-MCNC: 84 MG/DL
HBA1C MFR BLD HPLC: 5.6 %
HCT VFR BLD CALC: 39.8 %
HDLC SERPL-MCNC: 48 MG/DL
HGB BLD-MCNC: 13.3 G/DL
IMM GRANULOCYTES NFR BLD AUTO: 0.2 %
LDLC SERPL CALC-MCNC: 78 MG/DL
LYMPHOCYTES # BLD AUTO: 2.34 K/UL
LYMPHOCYTES NFR BLD AUTO: 39.3 %
MAN DIFF?: NORMAL
MCHC RBC-ENTMCNC: 31.4 PG
MCHC RBC-ENTMCNC: 33.4 G/DL
MCV RBC AUTO: 94.1 FL
MONOCYTES # BLD AUTO: 0.49 K/UL
MONOCYTES NFR BLD AUTO: 8.2 %
NEUTROPHILS # BLD AUTO: 2.83 K/UL
NEUTROPHILS NFR BLD AUTO: 47.5 %
NONHDLC SERPL-MCNC: 93 MG/DL
PLATELET # BLD AUTO: 243 K/UL
POTASSIUM SERPL-SCNC: 3.9 MMOL/L
PROT SERPL-MCNC: 7.9 G/DL
RBC # BLD: 4.23 M/UL
RBC # FLD: 12.9 %
SODIUM SERPL-SCNC: 139 MMOL/L
TESTOST SERPL-MCNC: 313 NG/DL
TRIGL SERPL-MCNC: 71 MG/DL
WBC # FLD AUTO: 5.96 K/UL

## 2024-12-03 ENCOUNTER — APPOINTMENT (OUTPATIENT)
Dept: PHYSICAL MEDICINE AND REHAB | Facility: CLINIC | Age: 59
End: 2024-12-03
Payer: COMMERCIAL

## 2024-12-03 VITALS — BODY MASS INDEX: 34.36 KG/M2 | WEIGHT: 240 LBS | HEIGHT: 70 IN

## 2024-12-03 DIAGNOSIS — E55.9 VITAMIN D DEFICIENCY, UNSPECIFIED: ICD-10-CM

## 2024-12-03 DIAGNOSIS — E78.00 PURE HYPERCHOLESTEROLEMIA, UNSPECIFIED: ICD-10-CM

## 2024-12-03 DIAGNOSIS — R73.09 OTHER ABNORMAL GLUCOSE: ICD-10-CM

## 2024-12-03 DIAGNOSIS — R73.03 PREDIABETES.: ICD-10-CM

## 2024-12-03 PROCEDURE — 99213 OFFICE O/P EST LOW 20 MIN: CPT

## 2024-12-31 ENCOUNTER — OFFICE (OUTPATIENT)
Facility: LOCATION | Age: 59
Setting detail: OPHTHALMOLOGY
End: 2024-12-31
Payer: COMMERCIAL

## 2024-12-31 DIAGNOSIS — H16.223: ICD-10-CM

## 2024-12-31 DIAGNOSIS — H35.413: ICD-10-CM

## 2024-12-31 DIAGNOSIS — H31.29: ICD-10-CM

## 2024-12-31 DIAGNOSIS — H25.13: ICD-10-CM

## 2024-12-31 PROBLEM — H01.001 BLEPHARITIS; RIGHT UPPER LID, RIGHT LOWER LID, LEFT UPPER LID, LEFT LOWER LID: Status: ACTIVE | Noted: 2024-12-31

## 2024-12-31 PROBLEM — H01.005 BLEPHARITIS; RIGHT UPPER LID, RIGHT LOWER LID, LEFT UPPER LID, LEFT LOWER LID: Status: ACTIVE | Noted: 2024-12-31

## 2024-12-31 PROBLEM — H01.004 BLEPHARITIS; RIGHT UPPER LID, RIGHT LOWER LID, LEFT UPPER LID, LEFT LOWER LID: Status: ACTIVE | Noted: 2024-12-31

## 2024-12-31 PROBLEM — H01.002 BLEPHARITIS; RIGHT UPPER LID, RIGHT LOWER LID, LEFT UPPER LID, LEFT LOWER LID: Status: ACTIVE | Noted: 2024-12-31

## 2024-12-31 PROCEDURE — 92004 COMPRE OPH EXAM NEW PT 1/>: CPT | Performed by: OPHTHALMOLOGY

## 2024-12-31 PROCEDURE — 92250 FUNDUS PHOTOGRAPHY W/I&R: CPT | Performed by: OPHTHALMOLOGY

## 2024-12-31 ASSESSMENT — CONFRONTATIONAL VISUAL FIELD TEST (CVF)
OD_FINDINGS: FULL
OS_FINDINGS: FULL

## 2024-12-31 ASSESSMENT — TONOMETRY
OD_IOP_MMHG: 17
OS_IOP_MMHG: 17

## 2025-01-02 ASSESSMENT — VISUAL ACUITY
OD_BCVA: 20/20
OS_BCVA: 20/20

## 2025-05-12 ENCOUNTER — LABORATORY RESULT (OUTPATIENT)
Age: 60
End: 2025-05-12

## 2025-05-12 ENCOUNTER — NON-APPOINTMENT (OUTPATIENT)
Age: 60
End: 2025-05-12

## 2025-05-12 ENCOUNTER — APPOINTMENT (OUTPATIENT)
Dept: PHYSICAL MEDICINE AND REHAB | Facility: CLINIC | Age: 60
End: 2025-05-12
Payer: COMMERCIAL

## 2025-05-12 VITALS
HEART RATE: 86 BPM | BODY MASS INDEX: 31.5 KG/M2 | SYSTOLIC BLOOD PRESSURE: 112 MMHG | RESPIRATION RATE: 16 BRPM | TEMPERATURE: 97.8 F | DIASTOLIC BLOOD PRESSURE: 78 MMHG | OXYGEN SATURATION: 97 % | WEIGHT: 220 LBS | HEIGHT: 70 IN

## 2025-05-12 DIAGNOSIS — R01.1 CARDIAC MURMUR, UNSPECIFIED: ICD-10-CM

## 2025-05-12 DIAGNOSIS — Z00.00 ENCOUNTER FOR GENERAL ADULT MEDICAL EXAMINATION W/OUT ABNORMAL FINDINGS: ICD-10-CM

## 2025-05-12 DIAGNOSIS — E66.811 OBESITY, CLASS 1: ICD-10-CM

## 2025-05-12 DIAGNOSIS — R73.03 PREDIABETES.: ICD-10-CM

## 2025-05-12 DIAGNOSIS — G47.30 SLEEP APNEA, UNSPECIFIED: ICD-10-CM

## 2025-05-12 LAB
BILIRUB UR QL STRIP: NEGATIVE
COLLECTION METHOD: NORMAL
GLUCOSE UR-MCNC: NEGATIVE
HCG UR QL: 0.2 EU/DL
HGB UR QL STRIP.AUTO: NORMAL
KETONES UR-MCNC: NEGATIVE
LEUKOCYTE ESTERASE UR QL STRIP: NEGATIVE
NITRITE UR QL STRIP: NEGATIVE
PH UR STRIP: 6
PROT UR STRIP-MCNC: NEGATIVE
SP GR UR STRIP: <=1.005

## 2025-05-12 PROCEDURE — 36410 VNPNXR 3YR/> PHY/QHP DX/THER: CPT

## 2025-05-12 PROCEDURE — 81003 URINALYSIS AUTO W/O SCOPE: CPT | Mod: QW

## 2025-05-12 PROCEDURE — 93000 ELECTROCARDIOGRAM COMPLETE: CPT | Mod: 59

## 2025-05-12 PROCEDURE — 99396 PREV VISIT EST AGE 40-64: CPT

## 2025-05-12 PROCEDURE — G0444 DEPRESSION SCREEN ANNUAL: CPT | Mod: 33,59

## 2025-05-13 LAB
25(OH)D3 SERPL-MCNC: 29 NG/ML
ALBUMIN SERPL ELPH-MCNC: 4.3 G/DL
ALP BLD-CCNC: 61 U/L
ALT SERPL-CCNC: 17 U/L
ANION GAP SERPL CALC-SCNC: 13 MMOL/L
AST SERPL-CCNC: 20 U/L
B BURGDOR AB SER-IMP: NEGATIVE
B BURGDOR IGG+IGM SER QL: 0.1 INDEX
BASOPHILS # BLD AUTO: 0.04 K/UL
BASOPHILS NFR BLD AUTO: 0.7 %
BILIRUB SERPL-MCNC: 0.6 MG/DL
BUN SERPL-MCNC: 17 MG/DL
CALCIUM SERPL-MCNC: 9.7 MG/DL
CHLORIDE SERPL-SCNC: 103 MMOL/L
CHOLEST SERPL-MCNC: 130 MG/DL
CO2 SERPL-SCNC: 24 MMOL/L
CREAT SERPL-MCNC: 1.03 MG/DL
EGFRCR SERPLBLD CKD-EPI 2021: 84 ML/MIN/1.73M2
EOSINOPHIL # BLD AUTO: 0.34 K/UL
EOSINOPHIL NFR BLD AUTO: 5.6 %
ESTIMATED AVERAGE GLUCOSE: 103 MG/DL
FERRITIN SERPL-MCNC: 238 NG/ML
FOLATE SERPL-MCNC: 12.8 NG/ML
GLUCOSE SERPL-MCNC: 84 MG/DL
HBA1C MFR BLD HPLC: 5.2 %
HCT VFR BLD CALC: 38.2 %
HDLC SERPL-MCNC: 45 MG/DL
HGB BLD-MCNC: 13.1 G/DL
IMM GRANULOCYTES NFR BLD AUTO: 0.2 %
IRON SATN MFR SERPL: 26 %
IRON SERPL-MCNC: 72 UG/DL
LDLC SERPL-MCNC: 69 MG/DL
LYMPHOCYTES # BLD AUTO: 2.51 K/UL
LYMPHOCYTES NFR BLD AUTO: 41.1 %
MAGNESIUM SERPL-MCNC: 2.1 MG/DL
MAN DIFF?: NORMAL
MCHC RBC-ENTMCNC: 30.9 PG
MCHC RBC-ENTMCNC: 34.3 G/DL
MCV RBC AUTO: 90.1 FL
MONOCYTES # BLD AUTO: 0.46 K/UL
MONOCYTES NFR BLD AUTO: 7.5 %
NEUTROPHILS # BLD AUTO: 2.74 K/UL
NEUTROPHILS NFR BLD AUTO: 44.9 %
NONHDLC SERPL-MCNC: 85 MG/DL
PLATELET # BLD AUTO: 262 K/UL
POTASSIUM SERPL-SCNC: 4.3 MMOL/L
PROT SERPL-MCNC: 7 G/DL
PSA FREE FLD-MCNC: 40 %
PSA FREE SERPL-MCNC: 0.2 NG/ML
PSA SERPL-MCNC: 0.51 NG/ML
RBC # BLD: 4.24 M/UL
RBC # FLD: 12.6 %
SODIUM SERPL-SCNC: 140 MMOL/L
T3 SERPL-MCNC: 101 NG/DL
T3RU NFR SERPL: 1 TBI
T4 FREE SERPL-MCNC: 1.2 NG/DL
TESTOST SERPL-MCNC: 388 NG/DL
TIBC SERPL-MCNC: 270 UG/DL
TRIGL SERPL-MCNC: 82 MG/DL
TSH SERPL-ACNC: 2.87 UIU/ML
UIBC SERPL-MCNC: 199 UG/DL
VIT B12 SERPL-MCNC: 484 PG/ML
WBC # FLD AUTO: 6.1 K/UL

## 2025-05-22 ENCOUNTER — APPOINTMENT (OUTPATIENT)
Dept: PHYSICAL MEDICINE AND REHAB | Facility: CLINIC | Age: 60
End: 2025-05-22
Payer: COMMERCIAL

## 2025-05-22 VITALS — HEIGHT: 70 IN | WEIGHT: 220 LBS | BODY MASS INDEX: 31.5 KG/M2

## 2025-05-22 DIAGNOSIS — E78.00 PURE HYPERCHOLESTEROLEMIA, UNSPECIFIED: ICD-10-CM

## 2025-05-22 DIAGNOSIS — R73.09 OTHER ABNORMAL GLUCOSE: ICD-10-CM

## 2025-05-22 DIAGNOSIS — E55.9 VITAMIN D DEFICIENCY, UNSPECIFIED: ICD-10-CM

## 2025-05-22 PROCEDURE — 99213 OFFICE O/P EST LOW 20 MIN: CPT | Mod: 3W
